# Patient Record
Sex: MALE | Race: WHITE | Employment: OTHER | URBAN - METROPOLITAN AREA
[De-identification: names, ages, dates, MRNs, and addresses within clinical notes are randomized per-mention and may not be internally consistent; named-entity substitution may affect disease eponyms.]

---

## 2017-01-20 ENCOUNTER — OFFICE VISIT (OUTPATIENT)
Dept: ORTHOPEDIC SURGERY | Age: 77
End: 2017-01-20

## 2017-01-20 VITALS
SYSTOLIC BLOOD PRESSURE: 131 MMHG | DIASTOLIC BLOOD PRESSURE: 78 MMHG | WEIGHT: 194 LBS | HEART RATE: 79 BPM | BODY MASS INDEX: 24.9 KG/M2 | HEIGHT: 74 IN

## 2017-01-20 DIAGNOSIS — M76.31 IT BAND SYNDROME, RIGHT: Primary | ICD-10-CM

## 2017-01-20 DIAGNOSIS — M25.561 RIGHT KNEE PAIN, UNSPECIFIED CHRONICITY: ICD-10-CM

## 2017-01-20 DIAGNOSIS — M54.16 LUMBAR RADICULOPATHY: ICD-10-CM

## 2017-01-20 PROBLEM — M76.30 IT BAND SYNDROME: Status: ACTIVE | Noted: 2017-01-20

## 2017-01-20 PROCEDURE — 99213 OFFICE O/P EST LOW 20 MIN: CPT | Performed by: PHYSICIAN ASSISTANT

## 2017-01-20 PROCEDURE — 73564 X-RAY EXAM KNEE 4 OR MORE: CPT | Performed by: PHYSICIAN ASSISTANT

## 2017-01-20 RX ORDER — GLUCOSAMINE HCL 500 MG
2000 TABLET ORAL
COMMUNITY

## 2017-01-20 RX ORDER — FINASTERIDE 5 MG/1
5 TABLET, FILM COATED ORAL DAILY
COMMUNITY

## 2017-01-20 RX ORDER — IBUPROFEN 600 MG/1
600 TABLET ORAL EVERY 6 HOURS PRN
Qty: 120 TABLET | Refills: 3 | Status: SHIPPED | OUTPATIENT
Start: 2017-01-20

## 2017-01-20 RX ORDER — FERROUS SULFATE 325(65) MG
325 TABLET ORAL
Status: ON HOLD | COMMUNITY
End: 2018-10-24 | Stop reason: HOSPADM

## 2017-01-26 ENCOUNTER — HOSPITAL ENCOUNTER (OUTPATIENT)
Dept: SURGERY | Age: 77
Discharge: HOME | End: 2017-01-26
Attending: INTERNAL MEDICINE | Admitting: INTERNAL MEDICINE

## 2017-01-26 VITALS
HEART RATE: 70 BPM | BODY MASS INDEX: 25.41 KG/M2 | RESPIRATION RATE: 14 BRPM | DIASTOLIC BLOOD PRESSURE: 76 MMHG | TEMPERATURE: 98 F | SYSTOLIC BLOOD PRESSURE: 135 MMHG | OXYGEN SATURATION: 98 % | WEIGHT: 198 LBS | HEIGHT: 74 IN

## 2017-01-26 RX ORDER — SODIUM CHLORIDE, SODIUM LACTATE, POTASSIUM CHLORIDE, CALCIUM CHLORIDE 600; 310; 30; 20 MG/100ML; MG/100ML; MG/100ML; MG/100ML
INJECTION, SOLUTION INTRAVENOUS CONTINUOUS
Status: DISCONTINUED | OUTPATIENT
Start: 2017-01-26 | End: 2017-01-27 | Stop reason: HOSPADM

## 2017-01-26 RX ORDER — LIDOCAINE HYDROCHLORIDE 10 MG/ML
0.1 INJECTION, SOLUTION EPIDURAL; INFILTRATION; INTRACAUDAL; PERINEURAL ONCE
Status: DISCONTINUED | OUTPATIENT
Start: 2017-01-26 | End: 2017-01-27 | Stop reason: HOSPADM

## 2017-01-26 RX ADMIN — SODIUM CHLORIDE, SODIUM LACTATE, POTASSIUM CHLORIDE, CALCIUM CHLORIDE: 600; 310; 30; 20 INJECTION, SOLUTION INTRAVENOUS at 08:36

## 2017-01-26 ASSESSMENT — PAIN DESCRIPTION - LOCATION: LOCATION: ABDOMEN

## 2017-01-26 ASSESSMENT — PAIN SCALES - GENERAL
PAINLEVEL_OUTOF10: 0

## 2017-01-26 ASSESSMENT — PAIN - FUNCTIONAL ASSESSMENT: PAIN_FUNCTIONAL_ASSESSMENT: 0-10

## 2018-10-20 ENCOUNTER — APPOINTMENT (OUTPATIENT)
Dept: GENERAL RADIOLOGY | Age: 78
End: 2018-10-20
Payer: MEDICARE

## 2018-10-20 ENCOUNTER — APPOINTMENT (OUTPATIENT)
Dept: CT IMAGING | Age: 78
End: 2018-10-20
Payer: MEDICARE

## 2018-10-20 ENCOUNTER — HOSPITAL ENCOUNTER (OUTPATIENT)
Age: 78
Setting detail: OBSERVATION
Discharge: HOME | End: 2018-10-24
Attending: EMERGENCY MEDICINE | Admitting: INTERNAL MEDICINE
Payer: MEDICARE

## 2018-10-20 DIAGNOSIS — R77.8 ELEVATED TROPONIN I LEVEL: Primary | ICD-10-CM

## 2018-10-20 DIAGNOSIS — R11.0 NAUSEA: ICD-10-CM

## 2018-10-20 DIAGNOSIS — R42 DIZZINESS: ICD-10-CM

## 2018-10-20 PROBLEM — R55 SYNCOPE AND COLLAPSE: Status: ACTIVE | Noted: 2018-10-20

## 2018-10-20 LAB
A/G RATIO: 1.6 (ref 1.1–2.2)
ALBUMIN SERPL-MCNC: 4.1 G/DL (ref 3.4–5)
ALP BLD-CCNC: 67 U/L (ref 40–129)
ALT SERPL-CCNC: 18 U/L (ref 10–40)
ANION GAP SERPL CALCULATED.3IONS-SCNC: 9 MMOL/L (ref 3–16)
AST SERPL-CCNC: 24 U/L (ref 15–37)
BASOPHILS ABSOLUTE: 0.1 K/UL (ref 0–0.2)
BASOPHILS RELATIVE PERCENT: 1.2 %
BILIRUB SERPL-MCNC: 0.8 MG/DL (ref 0–1)
BILIRUBIN URINE: NEGATIVE
BLOOD, URINE: NEGATIVE
BUN BLDV-MCNC: 17 MG/DL (ref 7–20)
CALCIUM SERPL-MCNC: 9.6 MG/DL (ref 8.3–10.6)
CHLORIDE BLD-SCNC: 103 MMOL/L (ref 99–110)
CLARITY: CLEAR
CO2: 30 MMOL/L (ref 21–32)
COLOR: YELLOW
CREAT SERPL-MCNC: 1 MG/DL (ref 0.8–1.3)
EKG ATRIAL RATE: 77 BPM
EKG DIAGNOSIS: NORMAL
EKG P AXIS: 48 DEGREES
EKG P-R INTERVAL: 168 MS
EKG Q-T INTERVAL: 430 MS
EKG QRS DURATION: 144 MS
EKG QTC CALCULATION (BAZETT): 486 MS
EKG R AXIS: -25 DEGREES
EKG T AXIS: 0 DEGREES
EKG VENTRICULAR RATE: 77 BPM
EOSINOPHILS ABSOLUTE: 0.1 K/UL (ref 0–0.6)
EOSINOPHILS RELATIVE PERCENT: 3 %
GFR AFRICAN AMERICAN: >60
GFR NON-AFRICAN AMERICAN: >60
GLOBULIN: 2.5 G/DL
GLUCOSE BLD-MCNC: 106 MG/DL (ref 70–99)
GLUCOSE BLD-MCNC: 91 MG/DL (ref 70–99)
GLUCOSE URINE: NEGATIVE MG/DL
HCT VFR BLD CALC: 41.8 % (ref 40.5–52.5)
HEMOGLOBIN: 14.6 G/DL (ref 13.5–17.5)
KETONES, URINE: NEGATIVE MG/DL
LEUKOCYTE ESTERASE, URINE: NEGATIVE
LYMPHOCYTES ABSOLUTE: 0.7 K/UL (ref 1–5.1)
LYMPHOCYTES RELATIVE PERCENT: 15.1 %
MAGNESIUM: 2 MG/DL (ref 1.8–2.4)
MCH RBC QN AUTO: 31 PG (ref 26–34)
MCHC RBC AUTO-ENTMCNC: 34.8 G/DL (ref 31–36)
MCV RBC AUTO: 88.9 FL (ref 80–100)
MICROSCOPIC EXAMINATION: NORMAL
MONOCYTES ABSOLUTE: 0.3 K/UL (ref 0–1.3)
MONOCYTES RELATIVE PERCENT: 7.1 %
NEUTROPHILS ABSOLUTE: 3.5 K/UL (ref 1.7–7.7)
NEUTROPHILS RELATIVE PERCENT: 73.6 %
NITRITE, URINE: NEGATIVE
PDW BLD-RTO: 13.6 % (ref 12.4–15.4)
PERFORMED ON: NORMAL
PH UA: 6.5
PLATELET # BLD: 172 K/UL (ref 135–450)
PMV BLD AUTO: 8.9 FL (ref 5–10.5)
POTASSIUM SERPL-SCNC: 3.7 MMOL/L (ref 3.5–5.1)
PROTEIN UA: NEGATIVE MG/DL
RBC # BLD: 4.71 M/UL (ref 4.2–5.9)
SODIUM BLD-SCNC: 142 MMOL/L (ref 136–145)
SPECIFIC GRAVITY UA: 1.01
TOTAL PROTEIN: 6.6 G/DL (ref 6.4–8.2)
TROPONIN: 0.02 NG/ML
TROPONIN: 0.02 NG/ML
URINE TYPE: NORMAL
UROBILINOGEN, URINE: 0.2 E.U./DL
WBC # BLD: 4.8 K/UL (ref 4–11)

## 2018-10-20 PROCEDURE — 99285 EMERGENCY DEPT VISIT HI MDM: CPT

## 2018-10-20 PROCEDURE — 6370000000 HC RX 637 (ALT 250 FOR IP): Performed by: EMERGENCY MEDICINE

## 2018-10-20 PROCEDURE — 85025 COMPLETE CBC W/AUTO DIFF WBC: CPT

## 2018-10-20 PROCEDURE — 93010 ELECTROCARDIOGRAM REPORT: CPT | Performed by: INTERNAL MEDICINE

## 2018-10-20 PROCEDURE — 2580000003 HC RX 258: Performed by: INTERNAL MEDICINE

## 2018-10-20 PROCEDURE — G0378 HOSPITAL OBSERVATION PER HR: HCPCS

## 2018-10-20 PROCEDURE — 96360 HYDRATION IV INFUSION INIT: CPT

## 2018-10-20 PROCEDURE — 80053 COMPREHEN METABOLIC PANEL: CPT

## 2018-10-20 PROCEDURE — 70450 CT HEAD/BRAIN W/O DYE: CPT

## 2018-10-20 PROCEDURE — 83735 ASSAY OF MAGNESIUM: CPT

## 2018-10-20 PROCEDURE — 81003 URINALYSIS AUTO W/O SCOPE: CPT

## 2018-10-20 PROCEDURE — 84484 ASSAY OF TROPONIN QUANT: CPT

## 2018-10-20 PROCEDURE — 2580000003 HC RX 258: Performed by: EMERGENCY MEDICINE

## 2018-10-20 PROCEDURE — 6370000000 HC RX 637 (ALT 250 FOR IP): Performed by: INTERNAL MEDICINE

## 2018-10-20 PROCEDURE — 71045 X-RAY EXAM CHEST 1 VIEW: CPT

## 2018-10-20 PROCEDURE — 93005 ELECTROCARDIOGRAM TRACING: CPT | Performed by: EMERGENCY MEDICINE

## 2018-10-20 RX ORDER — RAMIPRIL 5 MG/1
5 CAPSULE ORAL DAILY
Status: DISCONTINUED | OUTPATIENT
Start: 2018-10-20 | End: 2018-10-24 | Stop reason: HOSPADM

## 2018-10-20 RX ORDER — FAMOTIDINE 20 MG/1
20 TABLET, FILM COATED ORAL 2 TIMES DAILY
Status: DISCONTINUED | OUTPATIENT
Start: 2018-10-20 | End: 2018-10-24 | Stop reason: HOSPADM

## 2018-10-20 RX ORDER — MECLIZINE HCL 12.5 MG/1
50 TABLET ORAL ONCE
Status: DISCONTINUED | OUTPATIENT
Start: 2018-10-20 | End: 2018-10-23

## 2018-10-20 RX ORDER — PRAMIPEXOLE DIHYDROCHLORIDE 0.25 MG/1
0.5 TABLET ORAL EVERY OTHER DAY
Status: DISCONTINUED | OUTPATIENT
Start: 2018-10-20 | End: 2018-10-23

## 2018-10-20 RX ORDER — 0.9 % SODIUM CHLORIDE 0.9 %
1000 INTRAVENOUS SOLUTION INTRAVENOUS ONCE
Status: COMPLETED | OUTPATIENT
Start: 2018-10-20 | End: 2018-10-20

## 2018-10-20 RX ORDER — ONDANSETRON 2 MG/ML
4 INJECTION INTRAMUSCULAR; INTRAVENOUS EVERY 6 HOURS PRN
Status: DISCONTINUED | OUTPATIENT
Start: 2018-10-20 | End: 2018-10-24 | Stop reason: HOSPADM

## 2018-10-20 RX ORDER — ATORVASTATIN CALCIUM 10 MG/1
20 TABLET, FILM COATED ORAL DAILY
Status: DISCONTINUED | OUTPATIENT
Start: 2018-10-20 | End: 2018-10-23

## 2018-10-20 RX ORDER — ASPIRIN 81 MG/1
81 TABLET, CHEWABLE ORAL DAILY
Status: DISCONTINUED | OUTPATIENT
Start: 2018-10-20 | End: 2018-10-24 | Stop reason: HOSPADM

## 2018-10-20 RX ORDER — SODIUM CHLORIDE 0.9 % (FLUSH) 0.9 %
10 SYRINGE (ML) INJECTION PRN
Status: DISCONTINUED | OUTPATIENT
Start: 2018-10-20 | End: 2018-10-24 | Stop reason: HOSPADM

## 2018-10-20 RX ORDER — FINASTERIDE 5 MG/1
5 TABLET, FILM COATED ORAL DAILY
Status: DISCONTINUED | OUTPATIENT
Start: 2018-10-20 | End: 2018-10-24 | Stop reason: HOSPADM

## 2018-10-20 RX ORDER — FERROUS SULFATE 325(65) MG
325 TABLET ORAL
Status: DISCONTINUED | OUTPATIENT
Start: 2018-10-22 | End: 2018-10-23

## 2018-10-20 RX ORDER — ACETAMINOPHEN 325 MG/1
650 TABLET ORAL EVERY 4 HOURS PRN
Status: DISCONTINUED | OUTPATIENT
Start: 2018-10-20 | End: 2018-10-24 | Stop reason: HOSPADM

## 2018-10-20 RX ORDER — RAMIPRIL 5 MG/1
5 CAPSULE ORAL DAILY
Status: CANCELLED | OUTPATIENT
Start: 2018-10-20

## 2018-10-20 RX ORDER — SODIUM CHLORIDE 0.9 % (FLUSH) 0.9 %
10 SYRINGE (ML) INJECTION EVERY 12 HOURS SCHEDULED
Status: DISCONTINUED | OUTPATIENT
Start: 2018-10-20 | End: 2018-10-24 | Stop reason: HOSPADM

## 2018-10-20 RX ORDER — TAMSULOSIN HYDROCHLORIDE 0.4 MG/1
0.4 CAPSULE ORAL DAILY
Status: DISCONTINUED | OUTPATIENT
Start: 2018-10-20 | End: 2018-10-24 | Stop reason: HOSPADM

## 2018-10-20 RX ORDER — MECLIZINE HCL 12.5 MG/1
50 TABLET ORAL ONCE
Status: COMPLETED | OUTPATIENT
Start: 2018-10-20 | End: 2018-10-20

## 2018-10-20 RX ADMIN — Medication 10 ML: at 21:43

## 2018-10-20 RX ADMIN — ASPIRIN 81 MG: 81 TABLET, CHEWABLE ORAL at 16:12

## 2018-10-20 RX ADMIN — FINASTERIDE 5 MG: 5 TABLET, FILM COATED ORAL at 16:12

## 2018-10-20 RX ADMIN — FAMOTIDINE 20 MG: 20 TABLET ORAL at 21:43

## 2018-10-20 RX ADMIN — SODIUM CHLORIDE 1000 ML: 9 INJECTION, SOLUTION INTRAVENOUS at 10:53

## 2018-10-20 RX ADMIN — TAMSULOSIN HYDROCHLORIDE 0.4 MG: 0.4 CAPSULE ORAL at 16:14

## 2018-10-20 RX ADMIN — MECLIZINE 50 MG: 12.5 TABLET ORAL at 10:53

## 2018-10-20 RX ADMIN — RAMIPRIL 5 MG: 5 CAPSULE ORAL at 16:59

## 2018-10-20 ASSESSMENT — PAIN SCALES - GENERAL
PAINLEVEL_OUTOF10: 0
PAINLEVEL_OUTOF10: 0

## 2018-10-20 ASSESSMENT — HEART SCORE: ECG: 1

## 2018-10-21 LAB
CHOLESTEROL, TOTAL: 146 MG/DL (ref 0–199)
EKG ATRIAL RATE: 68 BPM
EKG DIAGNOSIS: NORMAL
EKG P AXIS: 63 DEGREES
EKG P-R INTERVAL: 186 MS
EKG Q-T INTERVAL: 442 MS
EKG QRS DURATION: 148 MS
EKG QTC CALCULATION (BAZETT): 469 MS
EKG R AXIS: -12 DEGREES
EKG T AXIS: 9 DEGREES
EKG VENTRICULAR RATE: 68 BPM
HDLC SERPL-MCNC: 37 MG/DL (ref 40–60)
LDL CHOLESTEROL CALCULATED: 92 MG/DL
TRIGL SERPL-MCNC: 83 MG/DL (ref 0–150)
VLDLC SERPL CALC-MCNC: 17 MG/DL

## 2018-10-21 PROCEDURE — 6360000002 HC RX W HCPCS: Performed by: INTERNAL MEDICINE

## 2018-10-21 PROCEDURE — 97535 SELF CARE MNGMENT TRAINING: CPT

## 2018-10-21 PROCEDURE — 93010 ELECTROCARDIOGRAM REPORT: CPT | Performed by: INTERNAL MEDICINE

## 2018-10-21 PROCEDURE — G8979 MOBILITY GOAL STATUS: HCPCS

## 2018-10-21 PROCEDURE — 2580000003 HC RX 258: Performed by: INTERNAL MEDICINE

## 2018-10-21 PROCEDURE — G8978 MOBILITY CURRENT STATUS: HCPCS

## 2018-10-21 PROCEDURE — G0378 HOSPITAL OBSERVATION PER HR: HCPCS

## 2018-10-21 PROCEDURE — 6370000000 HC RX 637 (ALT 250 FOR IP): Performed by: INTERNAL MEDICINE

## 2018-10-21 PROCEDURE — G8987 SELF CARE CURRENT STATUS: HCPCS

## 2018-10-21 PROCEDURE — 96372 THER/PROPH/DIAG INJ SC/IM: CPT

## 2018-10-21 PROCEDURE — 97116 GAIT TRAINING THERAPY: CPT

## 2018-10-21 PROCEDURE — 36415 COLL VENOUS BLD VENIPUNCTURE: CPT

## 2018-10-21 PROCEDURE — 97530 THERAPEUTIC ACTIVITIES: CPT

## 2018-10-21 PROCEDURE — G8988 SELF CARE GOAL STATUS: HCPCS

## 2018-10-21 PROCEDURE — 97165 OT EVAL LOW COMPLEX 30 MIN: CPT

## 2018-10-21 PROCEDURE — 80061 LIPID PANEL: CPT

## 2018-10-21 PROCEDURE — 93005 ELECTROCARDIOGRAM TRACING: CPT | Performed by: INTERNAL MEDICINE

## 2018-10-21 PROCEDURE — 97162 PT EVAL MOD COMPLEX 30 MIN: CPT

## 2018-10-21 PROCEDURE — 99215 OFFICE O/P EST HI 40 MIN: CPT | Performed by: INTERNAL MEDICINE

## 2018-10-21 RX ADMIN — Medication 10 ML: at 09:06

## 2018-10-21 RX ADMIN — FAMOTIDINE 20 MG: 20 TABLET ORAL at 09:06

## 2018-10-21 RX ADMIN — ASPIRIN 81 MG: 81 TABLET, CHEWABLE ORAL at 09:06

## 2018-10-21 RX ADMIN — Medication 10 ML: at 20:49

## 2018-10-21 RX ADMIN — TAMSULOSIN HYDROCHLORIDE 0.4 MG: 0.4 CAPSULE ORAL at 09:06

## 2018-10-21 RX ADMIN — FAMOTIDINE 20 MG: 20 TABLET ORAL at 20:49

## 2018-10-21 RX ADMIN — FINASTERIDE 5 MG: 5 TABLET, FILM COATED ORAL at 09:06

## 2018-10-21 RX ADMIN — RAMIPRIL 5 MG: 5 CAPSULE ORAL at 09:06

## 2018-10-21 RX ADMIN — ENOXAPARIN SODIUM 40 MG: 40 INJECTION SUBCUTANEOUS at 09:06

## 2018-10-21 ASSESSMENT — PAIN DESCRIPTION - PAIN TYPE: TYPE: ACUTE PAIN

## 2018-10-21 ASSESSMENT — PAIN SCALES - GENERAL
PAINLEVEL_OUTOF10: 2
PAINLEVEL_OUTOF10: 0

## 2018-10-21 ASSESSMENT — PAIN DESCRIPTION - LOCATION: LOCATION: HEAD

## 2018-10-21 NOTE — PLAN OF CARE
Problem: Musculor/Skeletal Functional Status  Intervention: OT Evaluation/treatment  UE There ex, Bed mobility, Transfers, ADL training, Adaptative equipment training, Therapeutic activity, Neuromuscular re-education, Patient education

## 2018-10-21 NOTE — PROGRESS NOTES
(Oral)   Resp 16   Ht 6' 2\" (1.88 m)   Wt 198 lb (89.8 kg)   SpO2 97%   BMI 25.42 kg/m²     General appearance: No apparent distress, appears stated age and cooperative. HEENT: Pupils equal, round, and reactive to light. Conjunctivae/corneas clear. Neck: Supple, with full range of motion. No jugular venous distention. Trachea midline. Respiratory:  Normal respiratory effort. Clear to auscultation, bilaterally without Rales/Wheezes/Rhonchi. Cardiovascular: Regular rate and rhythm with normal S1/S2 without murmurs, rubs or gallops. Abdomen: Soft, non-tender, non-distended with normal bowel sounds. Musculoskeletal: No clubbing, cyanosis or edema bilaterally. Full range of motion without deformity. Skin: Skin color, texture, turgor normal.  No rashes or lesions. Neurologic:  Neurovascularly intact without any focal sensory/motor deficits. Cranial nerves: II-XII intact, grossly non-focal. Decrease sensation bilaterally to lower extremities   Psychiatric: Alert and oriented, thought content appropriate, normal insight  Capillary Refill: Brisk,< 3 seconds   Peripheral Pulses: +2 palpable, equal bilaterally       Labs:   Recent Labs      10/20/18   1010   WBC  4.8   HGB  14.6   HCT  41.8   PLT  172     Recent Labs      10/20/18   1010   NA  142   K  3.7   CL  103   CO2  30   BUN  17   CREATININE  1.0   CALCIUM  9.6     Recent Labs      10/20/18   1010   AST  24   ALT  18   BILITOT  0.8   ALKPHOS  67     No results for input(s): INR in the last 72 hours. Recent Labs      10/20/18   1010  10/20/18   1238   TROPONINI  0.02*  0.02*       Urinalysis:    Lab Results   Component Value Date    NITRU Negative 10/20/2018    BLOODU Negative 10/20/2018    SPECGRAV 1.010 10/20/2018    GLUCOSEU Negative 10/20/2018       Radiology:  CT HEAD WO CONTRAST   Final Result   No acute intracranial abnormality. XR CHEST PORTABLE   Final Result   No acute cardiopulmonary disease.          NM MYOCARDIAL SPECT REST EXERCISE OR

## 2018-10-22 ENCOUNTER — APPOINTMENT (OUTPATIENT)
Dept: NUCLEAR MEDICINE | Age: 78
End: 2018-10-22
Payer: MEDICARE

## 2018-10-22 LAB
LV EF: 58 %
LV EF: 62 %
LVEF MODALITY: NORMAL
LVEF MODALITY: NORMAL

## 2018-10-22 PROCEDURE — 6360000002 HC RX W HCPCS: Performed by: INTERNAL MEDICINE

## 2018-10-22 PROCEDURE — 97116 GAIT TRAINING THERAPY: CPT

## 2018-10-22 PROCEDURE — 93017 CV STRESS TEST TRACING ONLY: CPT

## 2018-10-22 PROCEDURE — A9502 TC99M TETROFOSMIN: HCPCS | Performed by: INTERNAL MEDICINE

## 2018-10-22 PROCEDURE — 78452 HT MUSCLE IMAGE SPECT MULT: CPT

## 2018-10-22 PROCEDURE — 2580000003 HC RX 258: Performed by: INTERNAL MEDICINE

## 2018-10-22 PROCEDURE — G0378 HOSPITAL OBSERVATION PER HR: HCPCS

## 2018-10-22 PROCEDURE — 6370000000 HC RX 637 (ALT 250 FOR IP): Performed by: INTERNAL MEDICINE

## 2018-10-22 PROCEDURE — 3430000000 HC RX DIAGNOSTIC RADIOPHARMACEUTICAL: Performed by: INTERNAL MEDICINE

## 2018-10-22 PROCEDURE — 99220 PR INITIAL OBSERVATION CARE/DAY 70 MINUTES: CPT | Performed by: PSYCHIATRY & NEUROLOGY

## 2018-10-22 PROCEDURE — 93880 EXTRACRANIAL BILAT STUDY: CPT

## 2018-10-22 PROCEDURE — 96372 THER/PROPH/DIAG INJ SC/IM: CPT

## 2018-10-22 PROCEDURE — 93306 TTE W/DOPPLER COMPLETE: CPT | Performed by: INTERNAL MEDICINE

## 2018-10-22 RX ADMIN — ENOXAPARIN SODIUM 40 MG: 40 INJECTION SUBCUTANEOUS at 09:49

## 2018-10-22 RX ADMIN — TAMSULOSIN HYDROCHLORIDE 0.4 MG: 0.4 CAPSULE ORAL at 14:19

## 2018-10-22 RX ADMIN — Medication 10 ML: at 20:19

## 2018-10-22 RX ADMIN — FAMOTIDINE 20 MG: 20 TABLET ORAL at 20:19

## 2018-10-22 RX ADMIN — REGADENOSON 0.4 MG: 0.08 INJECTION, SOLUTION INTRAVENOUS at 09:09

## 2018-10-22 RX ADMIN — TETROFOSMIN 33.4 MILLICURIE: 0.23 INJECTION, POWDER, LYOPHILIZED, FOR SOLUTION INTRAVENOUS at 09:09

## 2018-10-22 RX ADMIN — FAMOTIDINE 20 MG: 20 TABLET ORAL at 14:19

## 2018-10-22 RX ADMIN — RAMIPRIL 5 MG: 5 CAPSULE ORAL at 14:18

## 2018-10-22 RX ADMIN — ASPIRIN 81 MG: 81 TABLET, CHEWABLE ORAL at 09:48

## 2018-10-22 RX ADMIN — FINASTERIDE 5 MG: 5 TABLET, FILM COATED ORAL at 14:18

## 2018-10-22 RX ADMIN — TETROFOSMIN 11.5 MILLICURIE: 0.23 INJECTION, POWDER, LYOPHILIZED, FOR SOLUTION INTRAVENOUS at 08:17

## 2018-10-22 RX ADMIN — Medication 10 ML: at 09:09

## 2018-10-22 NOTE — PROGRESS NOTES
Physical Therapy  Facility/Department: [facility] C5 - MED SURG/ORTHO  Daily Treatment Note  NAME: Harvey Foote  : 1940  MRN: [id number]    Date of Service: 10/22/2018    Discharge Recommendations:  Outpatient PT, Home with assist PRN   PT Equipment Recommendations  Equipment Needed: Yes  Mobility Devices: [place]: Rolling    Patient Diagnosis(es): The primary encounter diagnosis was Elevated troponin I level. Diagnoses of Dizziness and Nausea were also pertinent to this visit. has a past medical history of Asthma; Enlarged prostate; Fainting episodes; High blood pressure; Hyperlipidemia; Restless leg syndrome; and Status post placement of implantable loop recorder. has a past surgical history that includes hip surgery (Left, 7-3-2015); Insertable Cardiac Monitor; Hemorrhoid surgery; Colonoscopy; and Colonoscopy (2017). Restrictions  Restrictions/Precautions  Restrictions/Precautions: General Precautions, Fall Risk  Position Activity Restriction  Other position/activity restrictions: High fall risk per nursing assessment; telemetry  Subjective   General  Chart Reviewed: Yes  Response To Previous Treatment: Patient with no complaints from previous session. Family / Caregiver Present: No  Referring Practitioner: Michael Huff MD  Subjective  Subjective: Pt agreeable to PT   General Comment  Comments: Pt resting in bed  Pain Screening  Patient Currently in Pain: Denies       Objective   Bed mobility  Supine to Sit: Modified independent     Transfers  Sit to Stand: Stand by assistance  Stand to sit: Stand by assistance     Ambulation  Ambulation?: Yes  Ambulation 1  Surface: level tile  Device: Single point cane  Assistance: Contact guard assistance  Quality of Gait: Slightly unsteady initially with balance improving with further ambulation. No heel strike on RLE with decreased foot clearance.   Distance: 100 ft x 2  Stairs/Curb  Stairs?: Yes  Stairs  # Steps : 6  Stairs Height: 6\"  Rails: Left

## 2018-10-22 NOTE — CONSULTS
In patient Neurology consult        [facility] Neurology      MD Addie Mccoy  1940    Date of Service: 10/22/2018    Referring Physician: Sarah Torre MD      Reason for the consult: acute dizziness and possible stroke    HPI:   The patient is a 66y.o.  years old male with history of hypertension, hyperlipidemia, recurrent syncope and chronic vertigo who was admitted to [facility] yesterday with acute dizziness. Symptoms started 2 days ago. He describes sudden onset of feeling dizzy and unsteady on his feet. Degree was severe and duration was waxing and waning but persistent. No spinning sensation. No other triggers or other associated symptoms. No headache, passing out or falling. He does have history of chronic vertigo when he felt his current symptoms was somewhat different. He decided to come to the hospital where he was eventually admitted. Initial presentation revealed elevated blood pressure above 170/80. He has history of hypertension however he stopped taking such medications few weeks ago. Initial CT of the head showed no acute findings. He does have history of recurrent syncope and he is status post loop recorder implanted few weeks ago. He has been seen by EP and so far no evidence of cardiac arrhythmia on such recorder. Patient today feels somewhat better but not back to his baseline. He denies any headache or chest pain, dysphagia or dysarthria, weakness or numbness or tingling. He does have history of neuropathy. He walks with his walker. He feels unsteady at home but no recent falling or injury. Other review of system was unremarkable.           Past Medical History:   Diagnosis Date    Asthma     Enlarged prostate     Fainting episodes     High blood pressure     Hyperlipidemia     Restless leg syndrome     Status post placement of implantable loop recorder      Family History   Problem Relation Age of Onset    Heart Disease Father      Past Surgical History:   Procedure Laterality Date    COLONOSCOPY      COLONOSCOPY  01/2017    normal    HEMORRHOID SURGERY      HIP SURGERY Left 7-3-2015    Left bipolar hip prosthesis    INSERTABLE CARDIAC MONITOR       Past Surgical History:   Procedure Laterality Date    COLONOSCOPY      COLONOSCOPY  01/2017    normal    HEMORRHOID SURGERY      HIP SURGERY Left 7-3-2015    Left bipolar hip prosthesis    INSERTABLE CARDIAC MONITOR       Family History   Problem Relation Age of Onset    Heart Disease Father      Social History   Substance Use Topics    Smoking status: Former Smoker     Quit date: 1/26/1965    Smokeless tobacco: Never Used    Alcohol use Yes      Comment: once a week     No Known Allergies  Current Facility-Administered Medications   Medication Dose Route Frequency Provider Last Rate Last Dose    aspirin chewable tablet 81 mg  81 mg Oral Daily Kevin Gramajo MD   81 mg at 10/21/18 0816    atorvastatin (LIPITOR) tablet 20 mg  20 mg Oral Daily Kevin Gramajo MD        ferrous sulfate tablet 325 mg  325 mg Oral Once per day on Mon Thu Kristofer Herron MD        finasteride (PROSCAR) tablet 5 mg  5 mg Oral Daily Kevin Gramajo MD   5 mg at 10/21/18 3496    pramipexole (MIRAPEX) tablet 0.5 mg  0.5 mg Oral Every Other Day Kevin Gramajo MD        tamsulosin [facility]) capsule 0.4 mg  0.4 mg Oral Daily Kevin Gramajo MD   0.4 mg at 10/21/18 0906    sodium chloride flush 0.9 % injection 10 mL  10 mL Intravenous 2 times per day Kevin Gramajo MD   10 mL at 10/21/18 2049    sodium chloride flush 0.9 % injection 10 mL  10 mL Intravenous PRN Kevin Gramajo MD   10 mL at 10/22/18 5456    acetaminophen (TYLENOL) tablet 650 mg  650 mg Oral Q4H PRN Kevin Gramajo MD        magnesium hydroxide (MILK OF MAGNESIA) 400 MG/5ML suspension 30 mL  30 mL Oral Daily PRN Kevin Gramajo MD        ondansetron [facility]) injection 4 mg  4 mg Intravenous Q6H PRN Kevin Gramajo MD        famotidine triceps 2/4, brachial radialis 2/4, knee 2/4 and ankle 1/4. Planters: flexor bilaterally. Coordination: no pronator drift, no dysmetria with FNF testing. Sensation: normal to all modalities in his arms and decrease to vibration and joint sensation in his feet and ankle below the knee. .  Gait/Posture: unsteady    Data:  LABS:   Lab Results   Component Value Date     10/20/2018    K 3.7 10/20/2018     10/20/2018    CO2 30 10/20/2018    BUN 17 10/20/2018    CREATININE 1.0 10/20/2018    GFRAA >60 10/20/2018    LABGLOM >60 10/20/2018    GLUCOSE 106 10/20/2018    MG 2.00 10/20/2018    CALCIUM 9.6 10/20/2018     Lab Results   Component Value Date    WBC 4.8 10/20/2018    RBC 4.71 10/20/2018    HGB 14.6 10/20/2018    HCT 41.8 10/20/2018    MCV 88.9 10/20/2018    RDW 13.6 10/20/2018     10/20/2018     Lab Results   Component Value Date    INR 1.06 07/02/2015    PROTIME 11.5 07/02/2015       Neuroimaging and/or  labs reviewed by me and discussed results with the patient/and family. Impression:  Acute dizziness. Could be related to hypertensive encephalopathy and poorly controlled hypertension. New ischemic stroke cannot be fully excluded. Less likely related to his chronic vertigo  History of recurrent syncope. So far idiopathic  Hypertension, poorly controlled  Hyperlipidemia  Chronic length dependent idiopathic polyneuropathy     Recommendation:  MRI of the brain if possible with his loop recorder  Carotid Doppler  PT and OT  Telemetry  Agree with starting blood pressure medication  Aspirin  Statin  Supportive measures  Long discussion with the patient and family regarding risk of poorly controlled hypertension, secondary stroke prevention and appropriate monitoring of his blood pressure at home. Will need further workup outpatient for his chronic idiopathic polyneuropathy. Could contribute some to his ataxia but not related to his current symptoms.   DC planning when medically stable  Agree

## 2018-10-23 ENCOUNTER — APPOINTMENT (OUTPATIENT)
Dept: MRI IMAGING | Age: 78
End: 2018-10-23
Payer: MEDICARE

## 2018-10-23 PROCEDURE — 70551 MRI BRAIN STEM W/O DYE: CPT

## 2018-10-23 PROCEDURE — 2580000003 HC RX 258: Performed by: INTERNAL MEDICINE

## 2018-10-23 PROCEDURE — 6360000002 HC RX W HCPCS

## 2018-10-23 PROCEDURE — 97116 GAIT TRAINING THERAPY: CPT

## 2018-10-23 PROCEDURE — 90670 PCV13 VACCINE IM: CPT

## 2018-10-23 PROCEDURE — G0378 HOSPITAL OBSERVATION PER HR: HCPCS

## 2018-10-23 PROCEDURE — 6370000000 HC RX 637 (ALT 250 FOR IP): Performed by: REGISTERED NURSE

## 2018-10-23 PROCEDURE — 97110 THERAPEUTIC EXERCISES: CPT

## 2018-10-23 PROCEDURE — 99226 PR SBSQ OBSERVATION CARE/DAY 35 MINUTES: CPT | Performed by: PSYCHIATRY & NEUROLOGY

## 2018-10-23 PROCEDURE — 6360000002 HC RX W HCPCS: Performed by: INTERNAL MEDICINE

## 2018-10-23 PROCEDURE — 96372 THER/PROPH/DIAG INJ SC/IM: CPT

## 2018-10-23 PROCEDURE — 6370000000 HC RX 637 (ALT 250 FOR IP): Performed by: INTERNAL MEDICINE

## 2018-10-23 PROCEDURE — G0009 ADMIN PNEUMOCOCCAL VACCINE: HCPCS

## 2018-10-23 PROCEDURE — 97530 THERAPEUTIC ACTIVITIES: CPT

## 2018-10-23 RX ORDER — MECLIZINE HCL 12.5 MG/1
12.5 TABLET ORAL 3 TIMES DAILY PRN
Status: DISCONTINUED | OUTPATIENT
Start: 2018-10-23 | End: 2018-10-24 | Stop reason: HOSPADM

## 2018-10-23 RX ADMIN — RAMIPRIL 5 MG: 5 CAPSULE ORAL at 09:04

## 2018-10-23 RX ADMIN — MECLIZINE 12.5 MG: 12.5 TABLET ORAL at 16:49

## 2018-10-23 RX ADMIN — FAMOTIDINE 20 MG: 20 TABLET ORAL at 09:04

## 2018-10-23 RX ADMIN — FINASTERIDE 5 MG: 5 TABLET, FILM COATED ORAL at 09:04

## 2018-10-23 RX ADMIN — Medication 10 ML: at 09:05

## 2018-10-23 RX ADMIN — ENOXAPARIN SODIUM 40 MG: 40 INJECTION SUBCUTANEOUS at 09:04

## 2018-10-23 RX ADMIN — Medication 10 ML: at 21:24

## 2018-10-23 RX ADMIN — FAMOTIDINE 20 MG: 20 TABLET ORAL at 21:24

## 2018-10-23 RX ADMIN — ASPIRIN 81 MG: 81 TABLET, CHEWABLE ORAL at 09:04

## 2018-10-23 RX ADMIN — TAMSULOSIN HYDROCHLORIDE 0.4 MG: 0.4 CAPSULE ORAL at 09:04

## 2018-10-23 RX ADMIN — PNEUMOCOCCAL 13-VALENT CONJUGATE VACCINE 0.5 ML: 2.2; 2.2; 2.2; 2.2; 2.2; 4.4; 2.2; 2.2; 2.2; 2.2; 2.2; 2.2; 2.2 INJECTION, SUSPENSION INTRAMUSCULAR at 16:48

## 2018-10-23 ASSESSMENT — PAIN SCALES - GENERAL: PAINLEVEL_OUTOF10: 0

## 2018-10-23 NOTE — PROGRESS NOTES
sitting in desk chairs (safety awareness)  Distance: 150 ft  Ambulation 2  Surface - 2: level tile  Device 2: No device  Assistance 2: Stand by assistance  Quality of Gait 2: steady with slow ramesh; no LOB  Distance: 300 ft  Stairs/Curb  Stairs?: Yes  Stairs  # Steps : 3  Stairs Height: 6\"  Rails: Right ascending  Device: Single pt cane  Assistance: Stand by assistance  Comment: Pt negotiate 3 stairs with one HR and SPC. Negotiated three more stairs with bilateral HR. Cues for sequencing and technique. Wheelchair Activities  Wheelchair Size: 2 x     Balance  Sitting - Static: Good  Sitting - Dynamic: Good  Standing - Static: Fair;+  Standing - Dynamic: Fair;+  Exercises  Gluteal Sets: 15 B  Hip Flexion: 15 B  Knee Long Arc Quad: 15 B  Ankle Pumps: 15 B HR TR         Comment: discussed with pt and son d/c options. Assessment   Body structures, Functions, Activity limitations: Decreased functional mobility ; Decreased balance;Decreased strength;Decreased sensation  Treatment Diagnosis: weakness   Prognosis: Good  Patient Education: gait, ther ex  REQUIRES PT FOLLOW UP: Yes  Activity Tolerance  Activity Tolerance: Patient Tolerated treatment well     G-Code  AM-PAC Score  AM-PAC Inpatient Mobility Raw Score : 20  AM-PAC Inpatient T-Scale Score : 47.67  Mobility Inpatient CMS 0-100% Score: 35.83  Mobility Inpatient CMS G-Code Modifier : CJ          Goals  Short term goals  Time Frame for Short term goals: 5 days  Short term goal 1: Pt to be I with transfers  Short term goal 2: Pt to ambulate 200 ft with LRAD at mod (I)/I  Short term goal 3: Pt to ascend/decend 2 steps at mod (I)  Short term goal 4: Pt to tolerate X15 reps of BLE ex to improve strength/balance   Patient Goals   Patient goals : go home    Plan    Plan  Times per week: 3-5X/week  Times per day: Daily  Current Treatment Recommendations: Strengthening, ROM, Stair training, Balance Training, Endurance Training, Functional Mobility Training, Transfer

## 2018-10-23 NOTE — PROGRESS NOTES
Hospitalist Progress Note      PCP: Sparkle Reese MD    Date of Admission: 10/20/2018    Chief Complaint: Syncope, elevated troponin    Hospital Course:   66 y.o. male who presented to [facility] with subacute onset of syncopal episode gradually getting worst. Patient woke up this morning and noted was dizzy, lightheaded, unsteady on feet. Patient denies any spinning of objects surrounding to him. Patient has past medical history of vertigo but he states this symptom was not like vertigo. Patient denies any focal neurologic weakness. Patient denies any chest pain, shortness of breath, abdominal pain, nausea or vomiting. Patient has past medical history syncope and collapse at least 3 different times and had extensive evaluation including loop recorder and all workup has been  normal. Patient is admitted for further evaluation and     Subjective:   Pt is feeling weaker today than yesterday. He is on RA. Afebrile. He denies dyspnea or chest pain. No N/V/D.      Medications:  Reviewed    Infusion Medications   Scheduled Medications    aspirin  81 mg Oral Daily    atorvastatin  20 mg Oral Daily    ferrous sulfate  325 mg Oral Once per day on Mon Thu    finasteride  5 mg Oral Daily    pramipexole  0.5 mg Oral Every Other Day    tamsulosin  0.4 mg Oral Daily    sodium chloride flush  10 mL Intravenous 2 times per day    famotidine  20 mg Oral BID    enoxaparin  40 mg Subcutaneous Daily    ramipril  5 mg Oral Daily    pneumococcal 13-valent conjugate  0.5 mL Intramuscular Once     PRN Meds: sodium chloride flush, acetaminophen, magnesium hydroxide, ondansetron      Intake/Output Summary (Last 24 hours) at 10/23/18 0903  Last data filed at 10/23/18 0647   Gross per 24 hour   Intake              120 ml   Output                0 ml   Net              120 ml       Physical Exam Performed:    BP (!) 159/97   Pulse 71   Temp 98 °F (36.7 °C) (Oral)   Resp 16   Ht 6' 2\" (1.88 m)   Wt 198 lb (89.8

## 2018-10-23 NOTE — PROGRESS NOTES
Adria Garcia  Neurology Follow-up  [facility] Neurology    Date of Service: 10/23/2018    Subjective:   CC: Follow up today regarding: acute dizziness. Events noted. Chart and lab reviewed. He feels better compared to yesterday. He had one episode of dizziness with head turning for few minutes. No CP, headaches , weakness or neck pain. MRI is pending. Other ROS was negative. ROS : A 10-12 system review of constitutional, cardiovascular, respiratory, musculoskeletal, endocrine, skin, SHEENT, genitourinary, psychiatric and neurologic systems was obtained and updated today and is unremarkable except as mentioned  in my interval history.        Past Medical History:   Diagnosis Date    Asthma     Enlarged prostate     Fainting episodes     High blood pressure     Hyperlipidemia     Restless leg syndrome     Status post placement of implantable loop recorder      Current Facility-Administered Medications   Medication Dose Route Frequency Provider Last Rate Last Dose    aspirin chewable tablet 81 mg  81 mg Oral Daily Bebo Patel MD   81 mg at 10/23/18 0226    atorvastatin (LIPITOR) tablet 20 mg  20 mg Oral Daily Bebo Patel MD        ferrous sulfate tablet 325 mg  325 mg Oral Once per day on Mon Thu Ketan Carie Husain MD        finasteride (PROSCAR) tablet 5 mg  5 mg Oral Daily Bebo Patel MD   5 mg at 10/23/18 6933    pramipexole (MIRAPEX) tablet 0.5 mg  0.5 mg Oral Every Other Day Bebo Patel MD        tamsulosin [facility]) capsule 0.4 mg  0.4 mg Oral Daily Bebo Patel MD   0.4 mg at 10/23/18 6504    sodium chloride flush 0.9 % injection 10 mL  10 mL Intravenous 2 times per day Bebo Patel MD   10 mL at 10/23/18 0905    sodium chloride flush 0.9 % injection 10 mL  10 mL Intravenous PRN Bebo Patel MD   10 mL at 10/22/18 0909    acetaminophen (TYLENOL) tablet 650 mg  650 mg Oral Q4H PRN Bebo Patel MD        magnesium hydroxide (MILK OF MAGNESIA) 400 MG/5ML suspension 30 mL  30 mL Oral Daily PRN Gary Rodriguez MD        ond[facility]etron [facility]) injection 4 mg  4 mg Intravenous Q6H PRN Gary Rodriguez MD        famotidine (PEPCID) tablet 20 mg  20 mg Oral BID Gary Rodriguez MD   20 mg at 10/23/18 0733    enoxaparin (LOVENOX) injection 40 mg  40 mg Subcutaneous Daily Gary Rodriguez MD   40 mg at 10/23/18 7189    ramipril (ALTACE) capsule 5 mg  5 mg Oral Daily Gary Rodriguez MD   5 mg at 10/23/18 9428    pneumococcal 13-valent conjugate (PREVNAR) injection 0.5 mL  0.5 mL Intramuscular Once Mian Bustos RN         No Known Allergies  family history includes Heart Disease in his father. reports that he quit smoking about 53 years ago. He has never used smokeless tobacco. He reports that he drinks alcohol. He reports that he does not use drugs. Objective:  Exam:  Constitutional:   Vitals:    10/22/18 1910 10/22/18 2223 10/23/18 0356 10/23/18 0755   BP: (!) 147/84 (!) 159/87 (!) 147/82 (!) 159/97   Pulse: 67 70 70 71   Resp: 16 16 16 16   Temp: 97.6 °F (36.4 °C) 98.2 °F (36.8 °C) 97.7 °F (36.5 °C) 98 °F (36.7 °C)   TempSrc: Oral Oral Oral Oral   SpO2: 98% 98%  98%   Weight:       Height:           General appearance: NAD. Eye: No icterus. PRRR. Neck: supple  Cardiovascular: No carotid bruit. Heart: S1, S2         No lower leg edema with good pulsation. Mental Status: Oriented to person, place, problem, and time. Fluent speech. Normal attention span and concentration. Cranial Nerves:   II: Visual fields: Full to confrontation  III: Pupils: equal, round, reactive to light  III,IV,VI: Extra Ocular Movements are intact.  No nystagmus  V: Facial sensation is intact to pin prick and light touch  VII: Facial strength and movements: intact and symmetric  VIII: Hearing: Intact to finger rub bilaterally  IX: Palate elevation is symmetric  XI: Shoulder shrug is intact  XII: Tongue movements are normal  Musculoskeletal: 5/5 in all 4

## 2018-10-24 VITALS
TEMPERATURE: 98 F | HEIGHT: 74 IN | RESPIRATION RATE: 16 BRPM | OXYGEN SATURATION: 97 % | SYSTOLIC BLOOD PRESSURE: 174 MMHG | BODY MASS INDEX: 25.41 KG/M2 | DIASTOLIC BLOOD PRESSURE: 92 MMHG | HEART RATE: 75 BPM | WEIGHT: 198 LBS

## 2018-10-24 PROCEDURE — 6360000002 HC RX W HCPCS: Performed by: INTERNAL MEDICINE

## 2018-10-24 PROCEDURE — 97116 GAIT TRAINING THERAPY: CPT

## 2018-10-24 PROCEDURE — 6370000000 HC RX 637 (ALT 250 FOR IP): Performed by: INTERNAL MEDICINE

## 2018-10-24 PROCEDURE — 96372 THER/PROPH/DIAG INJ SC/IM: CPT

## 2018-10-24 PROCEDURE — 6370000000 HC RX 637 (ALT 250 FOR IP): Performed by: REGISTERED NURSE

## 2018-10-24 PROCEDURE — 97535 SELF CARE MNGMENT TRAINING: CPT

## 2018-10-24 PROCEDURE — G0378 HOSPITAL OBSERVATION PER HR: HCPCS

## 2018-10-24 PROCEDURE — 97530 THERAPEUTIC ACTIVITIES: CPT

## 2018-10-24 PROCEDURE — 2580000003 HC RX 258: Performed by: INTERNAL MEDICINE

## 2018-10-24 RX ORDER — MECLIZINE HCL 12.5 MG/1
12.5 TABLET ORAL 3 TIMES DAILY PRN
Qty: 30 TABLET | Refills: 3 | Status: SHIPPED | OUTPATIENT
Start: 2018-10-24 | End: 2018-11-03

## 2018-10-24 RX ADMIN — MECLIZINE 12.5 MG: 12.5 TABLET ORAL at 09:19

## 2018-10-24 RX ADMIN — ASPIRIN 81 MG: 81 TABLET, CHEWABLE ORAL at 09:19

## 2018-10-24 RX ADMIN — FAMOTIDINE 20 MG: 20 TABLET ORAL at 09:19

## 2018-10-24 RX ADMIN — ENOXAPARIN SODIUM 40 MG: 40 INJECTION SUBCUTANEOUS at 09:20

## 2018-10-24 RX ADMIN — Medication 10 ML: at 09:20

## 2018-10-24 RX ADMIN — TAMSULOSIN HYDROCHLORIDE 0.4 MG: 0.4 CAPSULE ORAL at 09:19

## 2018-10-24 RX ADMIN — FINASTERIDE 5 MG: 5 TABLET, FILM COATED ORAL at 09:19

## 2018-10-24 RX ADMIN — RAMIPRIL 5 MG: 5 CAPSULE ORAL at 09:19

## 2018-10-24 ASSESSMENT — PAIN SCALES - GENERAL: PAINLEVEL_OUTOF10: 0

## 2018-10-24 NOTE — PROGRESS NOTES
Left in chair, Chair alarm in place     Therapy Time   Individual Concurrent Group Co-treatment   Time In 1056         Time Out 1124         Minutes 28         Timed Code Treatment Minutes: [address] [facility]       Juan Jimenes, PT

## 2018-10-24 NOTE — CARE COORDINATION
Discussed therapy recs with pt this AM. Pt and son at bedside are aware of Home with assist PRN recommendation; pt would prefer [facility]. Referral placed to [address] per pt request. Son to transport pt home at discharge. No further needs identified.      Karla Blas RN

## 2018-10-24 NOTE — PROGRESS NOTES
Occupational Therapy  Facility/Department: [facility] C5 - MED SURG/ORTHO  Daily Treatment Note  NAME: Victor M Bowden  : 1940  MRN: [id number]    Date of Service: 10/24/2018    Discharge Recommendations:  [address]       Patient Diagnosis(es): The primary encounter diagnosis was Elevated troponin I level. Diagnoses of Dizziness and Nausea were also pertinent to this visit. has a past medical history of Asthma; Enlarged prostate; Fainting episodes; High blood pressure; Hyperlipidemia; Restless leg syndrome; and Status post placement of implantable loop recorder. has a past surgical history that includes hip surgery (Left, 7-3-2015); Insertable Cardiac Monitor; Hemorrhoid surgery; Colonoscopy; and Colonoscopy (2017). Restrictions  Restrictions/Precautions  Restrictions/Precautions: General Precautions, Fall Risk  Position Activity Restriction  Other position/activity restrictions: High fall risk per nursing assessment; telemetry     Subjective   General  Chart Reviewed: Yes  Patient assessed for rehabilitation services?: Yes  Response to previous treatment: Patient with no complaints from previous session  Family / Caregiver Present: Yes (son)  Referring Practitioner: Dr. Hiram Ortiz  Diagnosis: syncope & collapse, dizziness    Subjective  Subjective: Pt resting in bed, pleasant and agreeable to OT treatment. Pt reports feeling weak and unsteady.     Pain Assessment  Patient Currently in Pain: Denies     Orientation  Orientation  Overall Orientation Status: Within Normal Limits     Objective    ADL  Grooming: Stand by assistance (sink level 10 mins)  LE Dressing: Stand by assistance (socks and shoes)  Toileting: Stand by assistance     Balance  Sitting Balance: Supervision  Standing Balance: Stand by assistance (no AD)  Standing Balance  Sit to stand: Stand by assistance  Stand to sit: Stand by assistance    Functional Mobility  Functional - Mobility Device: No device  Activity: To/from bathroom; Other  Assist Level: Stand by assistance    Toilet Transfers  Toilet - Technique: Ambulating (no AD)  Equipment Used: Standard toilet (without use of grab bars to simulate home setup)  Toilet Transfer: Stand by assistance    Bed mobility  Supine to Sit: Supervision (to L with HOB elevated)     Transfers  Sit to stand: Stand by assistance  Stand to sit: Stand by assistance     Assessment   Performance deficits / Impairments: Decreased functional mobility ; Decreased balance;Decreased ADL status; Decreased high-level IADLs;Decreased strength  Assessment: Pt independent at baseline without AD, pt requiring SBA, reporting BLE feel like lifting bricks, slightly unsteady however no LOB observed. Pt with no assistance available at home and nearest family 3 hours away. Due to above noted deficits recommend SNF at d/c. Prognosis: Good  Patient Education: bed mobility, ADLs, functional transfers, functional mobility, role of OT  REQUIRES OT FOLLOW UP: Yes  Activity Tolerance  Activity Tolerance: Patient Tolerated treatment well  Safety Devices  Safety Devices in place: Yes  Type of devices: Call light within reach; Chair alarm in place;Nurse notified; Left in chair;Gait belt       Plan   Plan  Times per week: 3-5x/ week in acute care  Current Treatment Recommendations: ROM, Strengthening, Balance Training, Safety Education & Training, Self-Care / ADL, Functional Mobility Training    AM-PAC Score  AM-[facility] Inpatient Daily Activity Raw Score: 21  AM-PAC Inpatient ADL T-Scale Score : 44.27  ADL Inpatient CMS 0-100% Score: 32.79  ADL Inpatient CMS G-Code Modifier : CJ    Goals  Short term goals  Time Frame for Short term goals: 1 week  Short term goal 1: SBA with bathroom mobility by 10-28-18 (goal met 10/24/18)  Short term goal 2: Supervision standing ADL's at sink for 5-7 minutes  Short term goal 3: tolerate 15-20 reps BUE exercises to increase strength for ADL's & transfers  Short term goal 4: SBA with item

## 2018-10-24 NOTE — PROGRESS NOTES
IV removed. D/C instructions given. Pt verbalizes understanding and all questions answered. Medications picked up from outpatient pharmacy. Wheeled off unit in wheelchair to be driven home by son.

## 2018-11-03 ENCOUNTER — HOSPITAL ENCOUNTER (EMERGENCY)
Age: 78
Discharge: HOME | End: 2018-11-03
Attending: EMERGENCY MEDICINE
Payer: MEDICARE

## 2018-11-03 ENCOUNTER — APPOINTMENT (OUTPATIENT)
Dept: GENERAL RADIOLOGY | Age: 78
End: 2018-11-03
Payer: MEDICARE

## 2018-11-03 VITALS
DIASTOLIC BLOOD PRESSURE: 73 MMHG | OXYGEN SATURATION: 98 % | WEIGHT: 183 LBS | RESPIRATION RATE: 17 BRPM | SYSTOLIC BLOOD PRESSURE: 125 MMHG | TEMPERATURE: 97.6 F | BODY MASS INDEX: 23.49 KG/M2 | HEART RATE: 85 BPM | HEIGHT: 74 IN

## 2018-11-03 DIAGNOSIS — I49.3 PVC (PREMATURE VENTRICULAR CONTRACTION): ICD-10-CM

## 2018-11-03 DIAGNOSIS — R53.1 GENERALIZED WEAKNESS: Primary | ICD-10-CM

## 2018-11-03 LAB
A/G RATIO: 1.3 (ref 1.1–2.2)
ALBUMIN SERPL-MCNC: 4 G/DL (ref 3.4–5)
ALP BLD-CCNC: 76 U/L (ref 40–129)
ALT SERPL-CCNC: 16 U/L (ref 10–40)
ANION GAP SERPL CALCULATED.3IONS-SCNC: 11 MMOL/L (ref 3–16)
AST SERPL-CCNC: 24 U/L (ref 15–37)
BASOPHILS ABSOLUTE: 0.1 K/UL (ref 0–0.2)
BASOPHILS RELATIVE PERCENT: 1.3 %
BILIRUB SERPL-MCNC: 0.8 MG/DL (ref 0–1)
BILIRUBIN URINE: NEGATIVE
BLOOD, URINE: NEGATIVE
BUN BLDV-MCNC: 19 MG/DL (ref 7–20)
CALCIUM SERPL-MCNC: 9.9 MG/DL (ref 8.3–10.6)
CHLORIDE BLD-SCNC: 100 MMOL/L (ref 99–110)
CLARITY: CLEAR
CO2: 28 MMOL/L (ref 21–32)
COLOR: YELLOW
CREAT SERPL-MCNC: 1.1 MG/DL (ref 0.8–1.3)
EOSINOPHILS ABSOLUTE: 0.2 K/UL (ref 0–0.6)
EOSINOPHILS RELATIVE PERCENT: 2.8 %
GFR AFRICAN AMERICAN: >60
GFR NON-AFRICAN AMERICAN: >60
GLOBULIN: 3.1 G/DL
GLUCOSE BLD-MCNC: 92 MG/DL (ref 70–99)
GLUCOSE URINE: NEGATIVE MG/DL
HCT VFR BLD CALC: 43 % (ref 40.5–52.5)
HEMOGLOBIN: 14.8 G/DL (ref 13.5–17.5)
KETONES, URINE: NEGATIVE MG/DL
LEUKOCYTE ESTERASE, URINE: NEGATIVE
LYMPHOCYTES ABSOLUTE: 1 K/UL (ref 1–5.1)
LYMPHOCYTES RELATIVE PERCENT: 17.4 %
MCH RBC QN AUTO: 30.7 PG (ref 26–34)
MCHC RBC AUTO-ENTMCNC: 34.4 G/DL (ref 31–36)
MCV RBC AUTO: 89.1 FL (ref 80–100)
MICROSCOPIC EXAMINATION: NORMAL
MONOCYTES ABSOLUTE: 0.5 K/UL (ref 0–1.3)
MONOCYTES RELATIVE PERCENT: 8.2 %
NEUTROPHILS ABSOLUTE: 4.1 K/UL (ref 1.7–7.7)
NEUTROPHILS RELATIVE PERCENT: 70.3 %
NITRITE, URINE: NEGATIVE
PDW BLD-RTO: 13.3 % (ref 12.4–15.4)
PH UA: 7
PLATELET # BLD: 209 K/UL (ref 135–450)
PMV BLD AUTO: 8.6 FL (ref 5–10.5)
POTASSIUM SERPL-SCNC: 4 MMOL/L (ref 3.5–5.1)
PROTEIN UA: NEGATIVE MG/DL
RBC # BLD: 4.83 M/UL (ref 4.2–5.9)
SODIUM BLD-SCNC: 139 MMOL/L (ref 136–145)
SPECIFIC GRAVITY UA: 1.01
TOTAL PROTEIN: 7.1 G/DL (ref 6.4–8.2)
TROPONIN: 0.02 NG/ML
TROPONIN: <0.01 NG/ML
URINE TYPE: NORMAL
UROBILINOGEN, URINE: 0.2 E.U./DL
WBC # BLD: 5.8 K/UL (ref 4–11)

## 2018-11-03 PROCEDURE — 99284 EMERGENCY DEPT VISIT MOD MDM: CPT

## 2018-11-03 PROCEDURE — 71046 X-RAY EXAM CHEST 2 VIEWS: CPT

## 2018-11-03 PROCEDURE — 80053 COMPREHEN METABOLIC PANEL: CPT

## 2018-11-03 PROCEDURE — 84484 ASSAY OF TROPONIN QUANT: CPT

## 2018-11-03 PROCEDURE — 93005 ELECTROCARDIOGRAM TRACING: CPT | Performed by: EMERGENCY MEDICINE

## 2018-11-03 PROCEDURE — 93010 ELECTROCARDIOGRAM REPORT: CPT | Performed by: INTERNAL MEDICINE

## 2018-11-03 PROCEDURE — 81003 URINALYSIS AUTO W/O SCOPE: CPT

## 2018-11-03 PROCEDURE — 85025 COMPLETE CBC W/AUTO DIFF WBC: CPT

## 2018-11-03 NOTE — ED PROVIDER NOTES
[facility]  ED  eMERGENCY dEPARTMENT eNCOUnter        Pt Name: Cary Chapman  MRN: [id number]  Peggfvadim 1940  Date of evaluation: 11/3/2018  Provider: GERMAN Mcclendon  PCP: Rosalie Kehr, MD  ED Attending: Akin Anton MD    History provided by patient    CHIEF COMPLAINT:     Chief Complaint   Patient presents with    Dizziness     worse over the last few nights when he lays down. he is taking meclizine with slight relief       HISTORY OF PRESENT ILLNESS:      Cary Chapman is a 66 y.o. male who presents [facility]  ED with complaints of \"not feeling right\". Unable to describe the feeling. States he first noticed it last night, it is intermittent, and lasts only a few seconds. He was driving this AM when he noticed it again, so he came to the ED. Nothing brings it on, makes it worse, or relieves it. He was discharged 10 days ago for vertigo, which he was treated with meclizine. He states this does not feel the same. Admits to generalized weakness. Denies fever, dizziness, headache, change in vision, N/V, urinary burning or increased frequency, numbness, tingling, or changes in gait. Lives at home alone. Normally ambulates with a cane. Took his medications this morning with water, but did not eat. Nursing Notes were reviewed     REVIEW OF SYSTEMS:     Review of Systems  All systems, atotal of 10, are reviewed and negative except for those that were just noted in history present illness.         PAST MEDICAL HISTORY:     Past Medical History:   Diagnosis Date    Asthma     Enlarged prostate     Fainting episodes     High blood pressure     Hyperlipidemia     Restless leg syndrome     Status post placement of implantable loop recorder          SURGICAL HISTORY:      Past Surgical History:   Procedure Laterality Date    COLONOSCOPY      COLONOSCOPY  01/2017    normal    HEMORRHOID SURGERY      HIP SURGERY Left 7-3-2015    Left bipolar as follows:      XR CHEST STANDARD (2 VW)   Final Result   No acute findings. EKG:  See EKG interpretation byGerardo Mace MD and me in the absence of a cardiologist shows. Sinus Rhythm with PVCs with a rate of 80bpm  Axis is   Normal  QTc is  normal  Intervals and Durations are unremarkable. No specific ST-T wave changes appreciated. No evidence of acute ischemia. When compared with ECG of 21-OCT-2018 07:13,Premature ventricular complexes are now Present. PROCEDURES:   N/A    CRITICAL CARE TIME:   N/A    CONSULTS:  None      EMERGENCYDEPARTMENT COURSE and DIFFERENTIAL DIAGNOSIS/MDM:   Vitals:    Vitals:    11/03/18 0958 11/03/18 1035 11/03/18 1142   BP: (!) 169/112 (!) 155/105 107/75   Pulse: 89 80 89   Resp: 16 17 17   Temp: 97.6 °F (36.4 °C)     TempSrc: Oral     SpO2: 100% 99% 99%   Weight: 183 lb (83 kg)     Height: 6' 2\" (1.88 m)         Patient was given the following medications:  Medications - No data to display      Patient was evaluated by both myself and Khalida Us MD.     Reassessment:    Patient laboratory studies, radiographic imaging, andassessment were all discussed with the patient and/or patient family. There was shared decision-making between myself, the attending physician, as well as the patient and/or their surrogate and we are all in agreement with generalized weakness and PVCs. There was an opportunity for questions and all questions were answered to the best of my ability and to the satisfaction of the patient and/or patient family. I estimate there is LOW risk for ACUTE CORONARY SYNDROME, INTRACRANIAL HEMORRHAGE, MALIGNANT DYSRHYTHMIA, MENINGITIS, PNEUMONIA, PULMONARY EMBOLISM, SEPSIS, SUBARACHNOID HEMORRHAGE, SUBDURAL HEMATOMA, STROKE, or URINARY TRACT INFECTION, thus I consider the discharge disposition reasonable.  Jerod Huerta and I have discussed the diagnosis and risks, and we agree with discharging home to follow-up with their primary

## 2018-11-05 LAB
EKG ATRIAL RATE: 80 BPM
EKG DIAGNOSIS: NORMAL
EKG P AXIS: 47 DEGREES
EKG P-R INTERVAL: 158 MS
EKG Q-T INTERVAL: 402 MS
EKG QRS DURATION: 146 MS
EKG QTC CALCULATION (BAZETT): 463 MS
EKG R AXIS: -23 DEGREES
EKG T AXIS: 20 DEGREES
EKG VENTRICULAR RATE: 80 BPM

## 2018-11-13 ENCOUNTER — OFFICE VISIT (OUTPATIENT)
Dept: NEUROLOGY | Age: 78
End: 2018-11-13
Payer: MEDICARE

## 2018-11-13 VITALS
BODY MASS INDEX: 24 KG/M2 | SYSTOLIC BLOOD PRESSURE: 133 MMHG | OXYGEN SATURATION: 97 % | WEIGHT: 187 LBS | HEIGHT: 74 IN | DIASTOLIC BLOOD PRESSURE: 85 MMHG | HEART RATE: 87 BPM

## 2018-11-13 DIAGNOSIS — G62.9 POLYNEUROPATHY: ICD-10-CM

## 2018-11-13 DIAGNOSIS — H81.13 BENIGN PAROXYSMAL POSITIONAL VERTIGO DUE TO BILATERAL VESTIBULAR DISORDER: Primary | ICD-10-CM

## 2018-11-13 DIAGNOSIS — F34.1 DYSTHYMIA: ICD-10-CM

## 2018-11-13 DIAGNOSIS — R42 DIZZINESS: ICD-10-CM

## 2018-11-13 DIAGNOSIS — I10 ESSENTIAL HYPERTENSION: ICD-10-CM

## 2018-11-13 PROCEDURE — G8427 DOCREV CUR MEDS BY ELIG CLIN: HCPCS | Performed by: PSYCHIATRY & NEUROLOGY

## 2018-11-13 PROCEDURE — 1123F ACP DISCUSS/DSCN MKR DOCD: CPT | Performed by: PSYCHIATRY & NEUROLOGY

## 2018-11-13 PROCEDURE — 1101F PT FALLS ASSESS-DOCD LE1/YR: CPT | Performed by: PSYCHIATRY & NEUROLOGY

## 2018-11-13 PROCEDURE — 1036F TOBACCO NON-USER: CPT | Performed by: PSYCHIATRY & NEUROLOGY

## 2018-11-13 PROCEDURE — 99214 OFFICE O/P EST MOD 30 MIN: CPT | Performed by: PSYCHIATRY & NEUROLOGY

## 2018-11-13 PROCEDURE — 4040F PNEUMOC VAC/ADMIN/RCVD: CPT | Performed by: PSYCHIATRY & NEUROLOGY

## 2018-11-13 PROCEDURE — G8598 ASA/ANTIPLAT THER USED: HCPCS | Performed by: PSYCHIATRY & NEUROLOGY

## 2018-11-13 PROCEDURE — G8420 CALC BMI NORM PARAMETERS: HCPCS | Performed by: PSYCHIATRY & NEUROLOGY

## 2018-11-13 PROCEDURE — G8484 FLU IMMUNIZE NO ADMIN: HCPCS | Performed by: PSYCHIATRY & NEUROLOGY

## 2018-11-13 RX ORDER — SERTRALINE HYDROCHLORIDE 25 MG/1
25 TABLET, FILM COATED ORAL
COMMUNITY
Start: 2018-11-12

## 2018-11-13 NOTE — PROGRESS NOTES
The patient came today for follow up regarding: Recent hospital follow up, dizziness and hx of polyneuropathy     The patient was seen last month with acute vertigo. Further work up with MRI brain revealed no acute stroke   And chronic small vessel disease. He was dx with BPV and was sent home. The patient had an episode two weeks with visual disturbance and confusion while driving. He was seen in the ED and was dx with presyncope. He was seen by cardiology and had an event monitor. He had few sessions with vestibular training which helped his vertigo. No more dizzy or spinning spells. The patient has hx of chronic length dependent polyneuropathy with decrease sensation distally in his feet. He denies any recent falling but he feels off balance. He denies any numbness or tingling in hands or feet. Recent B12 or TSH in 5-18 wnl. No bowel or bladder issues. He was started on SSRI recently. No other new sx today and other ROS was negative. Past Medical History:   Diagnosis Date    Asthma     Enlarged prostate     Fainting episodes     High blood pressure     Hyperlipidemia     Restless leg syndrome     Status post placement of implantable loop recorder      Prior to Visit Medications    Medication Sig Taking? Authorizing Provider   sertraline (ZOLOFT) 25 MG tablet Take 25 mg by mouth Yes Historical Provider, MD   finasteride (PROSCAR) 5 MG tablet Take 5 mg by mouth daily Yes Historical Provider, MD   Cholecalciferol (VITAMIN D3) 3000 UNITS TABS Take 2,000 Units by mouth  Yes Historical Provider, MD   Multiple Vitamins-Minerals (CENTRUM SILVER ADULT 50+ PO) Take by mouth Yes Historical Provider, MD   ibuprofen (ADVIL;MOTRIN) 600 MG tablet Take 1 tablet by mouth every 6 hours as needed for Pain Yes IRAIS Acosta   tamsulosin (FLOMAX) 0.4 MG capsule Take 1 capsule by mouth daily Yes Bebo Patel MD   aspirin 81 MG tablet Take 81 mg by mouth daily.  Yes Historical Provider, MD     No Known
